# Patient Record
Sex: MALE | Race: WHITE | HISPANIC OR LATINO | Employment: OTHER | URBAN - METROPOLITAN AREA
[De-identification: names, ages, dates, MRNs, and addresses within clinical notes are randomized per-mention and may not be internally consistent; named-entity substitution may affect disease eponyms.]

---

## 2021-07-28 ENCOUNTER — HOSPITAL ENCOUNTER (EMERGENCY)
Age: 51
Discharge: HOME | End: 2021-07-28
Attending: EMERGENCY MEDICINE

## 2021-07-28 VITALS
HEART RATE: 85 BPM | RESPIRATION RATE: 18 BRPM | OXYGEN SATURATION: 96 % | HEIGHT: 67 IN | TEMPERATURE: 100.2 F | SYSTOLIC BLOOD PRESSURE: 143 MMHG | BODY MASS INDEX: 32.84 KG/M2 | DIASTOLIC BLOOD PRESSURE: 74 MMHG | WEIGHT: 209.22 LBS

## 2021-07-28 DIAGNOSIS — R10.9 CHRONIC ABDOMINAL PAIN: Primary | ICD-10-CM

## 2021-07-28 DIAGNOSIS — G89.29 CHRONIC ABDOMINAL PAIN: Primary | ICD-10-CM

## 2021-07-28 LAB
ALBUMIN SERPL-MCNC: 3.3 G/DL (ref 3.6–5.1)
ALBUMIN/GLOB SERPL: 0.6 {RATIO} (ref 1–2.4)
ALP SERPL-CCNC: 82 UNITS/L (ref 45–117)
ALT SERPL-CCNC: 15 UNITS/L
ANION GAP SERPL CALC-SCNC: 16 MMOL/L (ref 10–20)
AST SERPL-CCNC: 11 UNITS/L
BASOPHILS # BLD: 0 K/MCL (ref 0–0.3)
BASOPHILS NFR BLD: 0 %
BILIRUB SERPL-MCNC: 0.5 MG/DL (ref 0.2–1)
BUN SERPL-MCNC: 13 MG/DL (ref 6–20)
BUN/CREAT SERPL: 12 (ref 7–25)
CALCIUM SERPL-MCNC: 9 MG/DL (ref 8.4–10.2)
CHLORIDE SERPL-SCNC: 99 MMOL/L (ref 98–107)
CO2 SERPL-SCNC: 24 MMOL/L (ref 21–32)
CREAT SERPL-MCNC: 1.07 MG/DL (ref 0.67–1.17)
DEPRECATED RDW RBC: 38.6 FL (ref 39–50)
EOSINOPHIL # BLD: 0 K/MCL (ref 0–0.5)
EOSINOPHIL NFR BLD: 0 %
ERYTHROCYTE [DISTWIDTH] IN BLOOD: 13.7 % (ref 11–15)
FASTING DURATION TIME PATIENT: ABNORMAL H
GFR SERPLBLD BASED ON 1.73 SQ M-ARVRAT: 79 ML/MIN/1.73M2
GLOBULIN SER-MCNC: 5.1 G/DL (ref 2–4)
GLUCOSE SERPL-MCNC: 103 MG/DL (ref 65–99)
HCT VFR BLD CALC: 35.3 % (ref 39–51)
HGB BLD-MCNC: 11.3 G/DL (ref 13–17)
IMM GRANULOCYTES # BLD AUTO: 0.1 K/MCL (ref 0–0.2)
IMM GRANULOCYTES # BLD: 0 %
LIPASE SERPL-CCNC: 113 UNITS/L (ref 73–393)
LYMPHOCYTES # BLD: 2.8 K/MCL (ref 1–4)
LYMPHOCYTES NFR BLD: 20 %
MCH RBC QN AUTO: 24.8 PG (ref 26–34)
MCHC RBC AUTO-ENTMCNC: 32 G/DL (ref 32–36.5)
MCV RBC AUTO: 77.6 FL (ref 78–100)
MONOCYTES # BLD: 0.9 K/MCL (ref 0.3–0.9)
MONOCYTES NFR BLD: 6 %
NEUTROPHILS # BLD: 10.3 K/MCL (ref 1.8–7.7)
NEUTROPHILS NFR BLD: 74 %
NRBC BLD MANUAL-RTO: 0 /100 WBC
PLATELET # BLD AUTO: 356 K/MCL (ref 140–450)
POTASSIUM SERPL-SCNC: 3.9 MMOL/L (ref 3.4–5.1)
PROT SERPL-MCNC: 8.4 G/DL (ref 6.4–8.2)
RBC # BLD: 4.55 MIL/MCL (ref 4.5–5.9)
SODIUM SERPL-SCNC: 135 MMOL/L (ref 135–145)
WBC # BLD: 14 K/MCL (ref 4.2–11)

## 2021-07-28 PROCEDURE — 85025 COMPLETE CBC W/AUTO DIFF WBC: CPT | Performed by: EMERGENCY MEDICINE

## 2021-07-28 PROCEDURE — 80053 COMPREHEN METABOLIC PANEL: CPT | Performed by: EMERGENCY MEDICINE

## 2021-07-28 PROCEDURE — 36415 COLL VENOUS BLD VENIPUNCTURE: CPT

## 2021-07-28 PROCEDURE — 10002803 HB RX 637: Performed by: EMERGENCY MEDICINE

## 2021-07-28 PROCEDURE — 83690 ASSAY OF LIPASE: CPT | Performed by: EMERGENCY MEDICINE

## 2021-07-28 PROCEDURE — 99284 EMERGENCY DEPT VISIT MOD MDM: CPT | Performed by: EMERGENCY MEDICINE

## 2021-07-28 PROCEDURE — 99284 EMERGENCY DEPT VISIT MOD MDM: CPT

## 2021-07-28 RX ORDER — DICYCLOMINE HYDROCHLORIDE 10 MG/1
20 CAPSULE ORAL ONCE
Status: COMPLETED | OUTPATIENT
Start: 2021-07-28 | End: 2021-07-28

## 2021-07-28 RX ORDER — FAMOTIDINE 20 MG/1
20 TABLET, FILM COATED ORAL ONCE
Status: COMPLETED | OUTPATIENT
Start: 2021-07-28 | End: 2021-07-28

## 2021-07-28 RX ORDER — SULFAMETHOXAZOLE AND TRIMETHOPRIM 800; 160 MG/1; MG/1
1 TABLET ORAL 2 TIMES DAILY
Qty: 14 TABLET | Refills: 0 | Status: SHIPPED | OUTPATIENT
Start: 2021-07-28 | End: 2021-08-04

## 2021-07-28 RX ADMIN — FAMOTIDINE 20 MG: 20 TABLET ORAL at 22:20

## 2021-07-28 RX ADMIN — DICYCLOMINE HYDROCHLORIDE 20 MG: 10 CAPSULE ORAL at 22:20

## 2021-07-28 ASSESSMENT — ENCOUNTER SYMPTOMS
WEAKNESS: 0
ABDOMINAL PAIN: 1
TROUBLE SWALLOWING: 0
SHORTNESS OF BREATH: 0
FEVER: 0
VOMITING: 0

## 2021-07-28 ASSESSMENT — PAIN SCALES - GENERAL: PAINLEVEL_OUTOF10: 8

## 2021-07-28 ASSESSMENT — PAIN DESCRIPTION - PAIN TYPE: TYPE: ACUTE PAIN

## 2021-10-06 PROBLEM — N13.8 BPH WITH OBSTRUCTION/LOWER URINARY TRACT SYMPTOMS: Status: ACTIVE | Noted: 2021-10-05

## 2021-10-06 PROBLEM — R68.89 RIGORS: Status: ACTIVE | Noted: 2021-10-05

## 2021-10-06 PROBLEM — K92.1 HEMATOCHEZIA: Status: ACTIVE | Noted: 2021-10-05

## 2021-10-06 PROBLEM — C18.7 ADENOCARCINOMA OF SIGMOID COLON (HCC): Status: ACTIVE | Noted: 2021-10-06

## 2021-10-06 PROBLEM — D63.0 ANEMIA ASSOCIATED WITH MALIGNANT NEOPLASTIC DISEASE (HCC): Status: ACTIVE | Noted: 2021-10-05

## 2021-10-06 PROBLEM — R45.89 ANXIETY ABOUT HEALTH: Status: ACTIVE | Noted: 2021-10-06

## 2021-10-06 PROBLEM — R10.32 LEFT LOWER QUADRANT ABDOMINAL PAIN: Status: ACTIVE | Noted: 2021-10-06

## 2021-10-06 PROBLEM — C18.6 MALIGNANT NEOPLASM OF DESCENDING COLON (HCC): Status: ACTIVE | Noted: 2021-10-05

## 2021-10-06 PROBLEM — K62.5 RECTAL BLEEDING: Status: ACTIVE | Noted: 2021-10-06

## 2021-10-06 PROBLEM — R10.9 ABDOMINAL PAIN: Status: ACTIVE | Noted: 2021-05-25

## 2021-10-06 PROBLEM — R10.32 LEFT LOWER QUADRANT ABDOMINAL PAIN: Status: RESOLVED | Noted: 2021-10-06 | Resolved: 2021-10-06

## 2021-10-06 PROBLEM — N40.1 BPH WITH OBSTRUCTION/LOWER URINARY TRACT SYMPTOMS: Status: ACTIVE | Noted: 2021-10-05

## 2021-10-06 PROBLEM — C80.1 ANEMIA ASSOCIATED WITH MALIGNANT NEOPLASTIC DISEASE (HCC): Status: ACTIVE | Noted: 2021-10-05

## 2021-10-06 PROBLEM — R68.89 RIGORS: Status: RESOLVED | Noted: 2021-10-05 | Resolved: 2021-10-06

## 2021-10-06 PROBLEM — K92.1 HEMATOCHEZIA: Status: RESOLVED | Noted: 2021-10-05 | Resolved: 2021-10-06

## 2021-10-06 PROBLEM — K62.5 RECTAL BLEEDING: Status: RESOLVED | Noted: 2021-10-06 | Resolved: 2021-10-06

## 2024-11-27 ENCOUNTER — APPOINTMENT (OUTPATIENT)
Dept: GENERAL RADIOLOGY | Facility: HOSPITAL | Age: 54
End: 2024-11-27
Attending: UROLOGY
Payer: COMMERCIAL

## 2024-11-27 ENCOUNTER — HOSPITAL ENCOUNTER (OUTPATIENT)
Facility: HOSPITAL | Age: 54
Setting detail: HOSPITAL OUTPATIENT SURGERY
Discharge: HOME | End: 2024-11-27
Attending: UROLOGY | Admitting: UROLOGY
Payer: COMMERCIAL

## 2024-11-27 ENCOUNTER — ANESTHESIA (OUTPATIENT)
Dept: SURGERY | Facility: HOSPITAL | Age: 54
End: 2024-11-27
Payer: COMMERCIAL

## 2024-11-27 ENCOUNTER — ANESTHESIA EVENT (OUTPATIENT)
Dept: SURGERY | Facility: HOSPITAL | Age: 54
End: 2024-11-27
Payer: COMMERCIAL

## 2024-11-27 VITALS
HEIGHT: 67.5 IN | HEART RATE: 56 BPM | RESPIRATION RATE: 14 BRPM | WEIGHT: 188.38 LBS | BODY MASS INDEX: 29.22 KG/M2 | OXYGEN SATURATION: 100 % | TEMPERATURE: 97 F | SYSTOLIC BLOOD PRESSURE: 110 MMHG | DIASTOLIC BLOOD PRESSURE: 72 MMHG

## 2024-11-27 DIAGNOSIS — N13.5 URETERAL STRICTURE, RIGHT: Primary | ICD-10-CM

## 2024-11-27 PROBLEM — R10.9 ABDOMINAL PAIN: Status: RESOLVED | Noted: 2021-05-25 | Resolved: 2024-11-27

## 2024-11-27 PROCEDURE — 0T768DZ DILATION OF RIGHT URETER WITH INTRALUMINAL DEVICE, VIA NATURAL OR ARTIFICIAL OPENING ENDOSCOPIC: ICD-10-PCS | Performed by: UROLOGY

## 2024-11-27 DEVICE — URETERAL STENT
Type: IMPLANTABLE DEVICE | Site: URETER | Status: FUNCTIONAL
Brand: ASCERTA™

## 2024-11-27 RX ORDER — HYDROCODONE BITARTRATE AND ACETAMINOPHEN 5; 325 MG/1; MG/1
2 TABLET ORAL ONCE AS NEEDED
Status: DISCONTINUED | OUTPATIENT
Start: 2024-11-27 | End: 2024-11-27

## 2024-11-27 RX ORDER — HYDROCODONE BITARTRATE AND ACETAMINOPHEN 5; 325 MG/1; MG/1
1 TABLET ORAL ONCE AS NEEDED
Status: DISCONTINUED | OUTPATIENT
Start: 2024-11-27 | End: 2024-11-27

## 2024-11-27 RX ORDER — NALOXONE HYDROCHLORIDE 0.4 MG/ML
80 INJECTION, SOLUTION INTRAMUSCULAR; INTRAVENOUS; SUBCUTANEOUS AS NEEDED
Status: DISCONTINUED | OUTPATIENT
Start: 2024-11-27 | End: 2024-11-27

## 2024-11-27 RX ORDER — METOCLOPRAMIDE HYDROCHLORIDE 5 MG/ML
INJECTION INTRAMUSCULAR; INTRAVENOUS AS NEEDED
Status: DISCONTINUED | OUTPATIENT
Start: 2024-11-27 | End: 2024-11-27 | Stop reason: SURG

## 2024-11-27 RX ORDER — AMOXICILLIN 500 MG/1
500 CAPSULE ORAL 3 TIMES DAILY
Qty: 21 CAPSULE | Refills: 0 | Status: SHIPPED | OUTPATIENT
Start: 2024-11-27 | End: 2024-12-04

## 2024-11-27 RX ORDER — SODIUM CHLORIDE, SODIUM LACTATE, POTASSIUM CHLORIDE, CALCIUM CHLORIDE 600; 310; 30; 20 MG/100ML; MG/100ML; MG/100ML; MG/100ML
INJECTION, SOLUTION INTRAVENOUS CONTINUOUS
Status: DISCONTINUED | OUTPATIENT
Start: 2024-11-27 | End: 2024-11-27

## 2024-11-27 RX ORDER — LIDOCAINE HYDROCHLORIDE 20 MG/ML
JELLY TOPICAL AS NEEDED
Status: DISCONTINUED | OUTPATIENT
Start: 2024-11-27 | End: 2024-11-27 | Stop reason: HOSPADM

## 2024-11-27 RX ORDER — SCOLOPAMINE TRANSDERMAL SYSTEM 1 MG/1
1 PATCH, EXTENDED RELEASE TRANSDERMAL ONCE
Status: DISCONTINUED | OUTPATIENT
Start: 2024-11-27 | End: 2024-11-27 | Stop reason: HOSPADM

## 2024-11-27 RX ORDER — HYDROMORPHONE HYDROCHLORIDE 1 MG/ML
0.6 INJECTION, SOLUTION INTRAMUSCULAR; INTRAVENOUS; SUBCUTANEOUS EVERY 5 MIN PRN
Status: DISCONTINUED | OUTPATIENT
Start: 2024-11-27 | End: 2024-11-27

## 2024-11-27 RX ORDER — MEPERIDINE HYDROCHLORIDE 25 MG/ML
12.5 INJECTION INTRAMUSCULAR; INTRAVENOUS; SUBCUTANEOUS AS NEEDED
Status: DISCONTINUED | OUTPATIENT
Start: 2024-11-27 | End: 2024-11-27

## 2024-11-27 RX ORDER — ACETAMINOPHEN 500 MG
1000 TABLET ORAL ONCE AS NEEDED
Status: DISCONTINUED | OUTPATIENT
Start: 2024-11-27 | End: 2024-11-27

## 2024-11-27 RX ORDER — LIDOCAINE HYDROCHLORIDE 10 MG/ML
INJECTION, SOLUTION EPIDURAL; INFILTRATION; INTRACAUDAL; PERINEURAL AS NEEDED
Status: DISCONTINUED | OUTPATIENT
Start: 2024-11-27 | End: 2024-11-27 | Stop reason: SURG

## 2024-11-27 RX ORDER — ONDANSETRON 2 MG/ML
INJECTION INTRAMUSCULAR; INTRAVENOUS AS NEEDED
Status: DISCONTINUED | OUTPATIENT
Start: 2024-11-27 | End: 2024-11-27 | Stop reason: SURG

## 2024-11-27 RX ORDER — ACETAMINOPHEN 500 MG
1000 TABLET ORAL ONCE
Status: DISCONTINUED | OUTPATIENT
Start: 2024-11-27 | End: 2024-11-27

## 2024-11-27 RX ORDER — HYDROMORPHONE HYDROCHLORIDE 1 MG/ML
0.2 INJECTION, SOLUTION INTRAMUSCULAR; INTRAVENOUS; SUBCUTANEOUS EVERY 5 MIN PRN
Status: DISCONTINUED | OUTPATIENT
Start: 2024-11-27 | End: 2024-11-27

## 2024-11-27 RX ORDER — HYDROMORPHONE HYDROCHLORIDE 1 MG/ML
0.4 INJECTION, SOLUTION INTRAMUSCULAR; INTRAVENOUS; SUBCUTANEOUS EVERY 5 MIN PRN
Status: DISCONTINUED | OUTPATIENT
Start: 2024-11-27 | End: 2024-11-27

## 2024-11-27 RX ORDER — LABETALOL HYDROCHLORIDE 5 MG/ML
5 INJECTION, SOLUTION INTRAVENOUS EVERY 5 MIN PRN
Status: DISCONTINUED | OUTPATIENT
Start: 2024-11-27 | End: 2024-11-27

## 2024-11-27 RX ORDER — ONDANSETRON 2 MG/ML
4 INJECTION INTRAMUSCULAR; INTRAVENOUS EVERY 6 HOURS PRN
Status: DISCONTINUED | OUTPATIENT
Start: 2024-11-27 | End: 2024-11-27

## 2024-11-27 RX ORDER — PROCHLORPERAZINE EDISYLATE 5 MG/ML
5 INJECTION INTRAMUSCULAR; INTRAVENOUS EVERY 8 HOURS PRN
Status: DISCONTINUED | OUTPATIENT
Start: 2024-11-27 | End: 2024-11-27

## 2024-11-27 RX ORDER — DIPHENHYDRAMINE HYDROCHLORIDE 50 MG/ML
12.5 INJECTION INTRAMUSCULAR; INTRAVENOUS AS NEEDED
Status: DISCONTINUED | OUTPATIENT
Start: 2024-11-27 | End: 2024-11-27

## 2024-11-27 RX ADMIN — LIDOCAINE HYDROCHLORIDE 100 MG: 10 INJECTION, SOLUTION EPIDURAL; INFILTRATION; INTRACAUDAL; PERINEURAL at 15:47:00

## 2024-11-27 RX ADMIN — ONDANSETRON 4 MG: 2 INJECTION INTRAMUSCULAR; INTRAVENOUS at 15:41:00

## 2024-11-27 RX ADMIN — METOCLOPRAMIDE HYDROCHLORIDE 10 MG: 5 INJECTION INTRAMUSCULAR; INTRAVENOUS at 15:41:00

## 2024-11-27 RX ADMIN — SODIUM CHLORIDE, SODIUM LACTATE, POTASSIUM CHLORIDE, CALCIUM CHLORIDE: 600; 310; 30; 20 INJECTION, SOLUTION INTRAVENOUS at 15:39:00

## 2024-11-27 NOTE — ANESTHESIA PREPROCEDURE EVALUATION
PRE-OP EVALUATION    Patient Name: Aden Berg    Admit Diagnosis: HYDRONEPHROSIS OF RIGHT KIDNEY, RIGHT URETERAL STRICTURE    Pre-op Diagnosis: HYDRONEPHROSIS OF RIGHT KIDNEY, RIGHT URETERAL STRICTURE    CYSTOSCOPY, RIGHT URETERAL STENT EXCHANGE    Anesthesia Procedure: CYSTOSCOPY, RIGHT URETERAL STENT EXCHANGE (Right: Ureter)    Surgeons and Role:     * Kevin Mcgregor MD - Primary    Pre-op vitals reviewed.  Temp: 97.8 °F (36.6 °C)  Pulse: 53  Resp: 16  BP: 113/68  SpO2: 100 %  Body mass index is 29.07 kg/m².    Current medications reviewed.  Hospital Medications:   [Transfer Hold] scopolamine (Transderm-Scop) 1 MG/3DAYS patch 1 patch  1 patch Transdermal Once    lactated ringers infusion   Intravenous Continuous    [COMPLETED] ampicillin (Omnipen) 1 g in sodium chloride 0.9% 100 mL IVPB-MBP  1 g Intravenous Once    lidocaine (Urojet) 2 % urethral jelly    PRN       Outpatient Medications:   Prescriptions Prior to Admission[1]    Allergies: Lactose      Anesthesia Evaluation    Patient summary reviewed.    Anesthetic Complications  (-) history of anesthetic complications         GI/Hepatic/Renal      (+) GERD                           Cardiovascular    Negative cardiovascular ROS.  ECG reviewed.  Exercise tolerance: good     MET: >4                             (-) angina     (-) KAUR  (-) orthopnea  (-) PND     Endo/Other    Negative endo/other ROS.                              Pulmonary    Negative pulmonary ROS.           (+) shortness of breath  (+) recent URI          Neuro/Psych    Negative neuro/psych ROS.                                  Past Surgical History:   Procedure Laterality Date    Colonoscopy  09/17/2021    x2    Cystoscopy,insert ureteral stent      x4 ureteral stent exchange / cystoscopy    Laparoscopy,diagnostic      [facility]  exploratory lap    Other surgical history      colon resection 9/2021-    Other surgical history      port a cath right side     Social History      Socioeconomic History    Marital status:      Spouse name: Mary Lou Murica    Number of children: 3   Occupational History    Occupation:    Tobacco Use    Smoking status: Former     Types: Cigarettes    Smokeless tobacco: Never    Tobacco comments:     Former social smoker, 4-5 cigarettes per 1 week   Vaping Use    Vaping status: Never Used   Substance and Sexual Activity    Alcohol use: Yes     Alcohol/week: 0.0 standard drinks of alcohol     Comment: social use.    Drug use: Never    Sexual activity: Yes     Partners: Female     Comment: .   Other Topics Concern     Service No    Blood Transfusions No    Caffeine Concern No    Occupational Exposure No    Hobby Hazards No    Sleep Concern No    Stress Concern No    Weight Concern No    Special Diet No    Back Care No    Exercise Yes    Bike Helmet No    Seat Belt Yes    Self-Exams Yes     History   Drug Use Unknown     Available pre-op labs reviewed.               Airway      Mallampati: II  Mouth opening: 3 FB  TM distance: 4 - 6 cm  Neck ROM: full Cardiovascular    Cardiovascular exam normal.  Rhythm: regular  Rate: normal  (-) murmur   Dental    Dentition appears grossly intact         Pulmonary      Breath sounds clear to auscultation bilaterally.        (-) wheezes       Other findings              ASA: 2   Plan: general  NPO status verified and patient meets guidelines.          Plan/risks discussed with: patient                We discussed GA w/LMA or ETT and possible scratchy throat and rarely dental damage.  We discussed analgesic plan and PONV prophylaxis.  The patient's questions were answered and consent was attained.          [1]   Medications Prior to Admission   Medication Sig Dispense Refill Last Dose/Taking    Polyethylene Glycol 3350 17 GM/SCOOP Oral Powder Take 17 g by mouth as needed.   Taking As Needed    alfuzosin 10 MG Oral Tablet 24 Hr Take 1 tablet (10 mg total) by mouth every evening.   11/24/2024     docusate sodium 100 MG Oral Cap Take 1 capsule (100 mg total) by mouth as needed.   Taking As Needed    ferrous sulfate 325 (65 FE) MG Oral Tab EC Take 1 tablet (325 mg total) by mouth daily with breakfast.   Taking    B COMPLEX-C-FOLIC ACID OR Take 1 tablet by mouth daily.   11/19/2024    Cholecalciferol 50 MCG (2000 UT) Oral Tab    11/19/2024    hydrocortisone (PROCTOZONE-HC) 2.5 % External Cream Apply small amoutnt perirectal 2 times daily as needed for mild to moderate pain 28 g 2 More than a month    hydrocortisone 25 MG Rectal Suppos Place 1 suppository (25 mg total) rectally daily as needed (for severe rectal pain). 12 suppository 0 More than a month    prochlorperazine (COMPAZINE) 10 mg tablet Take 1 tablet (10 mg total) by mouth every 6 (six) hours as needed for Nausea. 30 tablet 1 More than a month    dexamethasone (DECADRON) 4 MG tablet 4 mg tablets. Take 2 tablets in AM for 2 days after the infusion. Take with food to minimize upset stomach. Do not take later than 3 pm to void sleeping difficulties. 30 tablet 3     ondansetron (ZOFRAN) 8 MG tablet Take 8 mg every 8 hours if needed for nausea. 30 tablet 3 More than a month    tamsulosin (FLOMAX) cap Take 1 capsule (0.4 mg total) by mouth nightly.

## 2024-11-27 NOTE — ANESTHESIA PROCEDURE NOTES
Airway  Date/Time: 11/27/2024 3:47 PM  Urgency: elective      General Information and Staff    Patient location during procedure: OR  Anesthesiologist: Gerhard Lay MD  Performed: anesthesiologist   Performed by: Gerhard Lay MD  Authorized by: Gerhard Lay MD      Indications and Patient Condition  Indications for airway management: anesthesia  Spontaneous Ventilation: absent  Sedation level: deep  Preoxygenated: yes  Patient position: sniffing  Mask difficulty assessment: 1 - vent by mask    Final Airway Details  Final airway type: supraglottic airway      Successful airway: classic  Size 3       Number of attempts at approach: 1  Number of other approaches attempted: 0

## 2024-11-27 NOTE — OPERATIVE REPORT
OPERATIVE NOTE    PATIENT NAME: Aden Berg  YOB: 1970  DATE OF SERVICE: 11/27/2024    SURGEON:  Kevin Mcgregor MD    ASSISTANT:  None    PREOPERATIVE DIAGNOSIS:  Right ureteral stricture, recurrent UTIs with enterococcus    POSTOPERATIVE DIAGNOSIS:  Same    PROCEDURE PERFORMED:  Cystoscopy  Right Ureteral Stent Exchange  Intraoperative interpretation of fluoroscopic images    ASSISTANTS:  None    ANESTHESIA:  General    ANTIBIOTIC PROPHYLAXIS:  Ampicillin    SPECIMENS:  None    DRAINS:  6F x 24 cm JJ right ureteral stent    ESTIMATED BLOOD LOSS:  Minimal    COMPLICATIONS:  No immediate complications.     INDICATIONS FOR PROCEDURE:  Mr. Berg is a 54 year old gentleman with colon cancer s/p a partial colectomy, chemo, and radiation. He developed a right ureteral stricture which has been managed with chronic stent exchanges.  He recently was noted to have recurrent UTIs with enterococcus.  I suspect his stent is colonized with bacteria and this is causing his recurrent infections. I recommended we proceed with a ureteral stent exchange, in hopes of decreasing his recurrent infections.     We discussed the risks of the procedure including bleeding, infection, or damage to the urologic structures.  Furthermore, they understand that ureteral stents can cause flank pain and/or urinary symptoms.     DESCRIPTION OF PROCEDURE:  After reviewing the indications for the procedure, informed consent was reviewed and signed by the patient.    The patient was brought to the operating room and placed in the supine position on the OR table.  SCD's were applied and all pressure points were carefully padded.  At this point, anesthesia was successfully induced.  The patient was then given the perioperative antibiotics listed above prior to the procedure.  The patient was transferred to the dorsal lithotomy position and prepped and draped in the normal sterile fashion using betadine.  We then performed an operative  time out to confirm the correct patient, procedure, and laterality.  Everyone in the room was in agreement.     I began by inserting a 22F rigid cystoscope into the bladder per urethra.  The urethra was without lesions or strictures.  Upon entering the bladder I performed a thorough cystoscopy which did not reveal any bladder lesions, stones, or other pathology.  I identified the bilateral ureteral orifices in their orthotopic locations.  There was a stent eminating from the right UO.  I grasped with flexible graspers and pulled it beyond the urethral meatus. I threaded the stent with a 0.035 wire and advanced this into the renal pelvis under fluoroscopic guidance.     Over the wire, I then placed a new 6F x 24 cm JJ right ureteral stent over the wire. The stent was deployed, with a good proximal curl on fluoroscopy, and a good distal curl visually within the bladder lumen.    I then emptied the bladder of any stone debris and irrigant. 2% viscous lidocaine was instilled into the urethra for post-operative analgesia.    This completed the procedure.  They tolerated it well without complications.    Please note that I was present for, and completed the entirety of this operation myself.    PLAN:  Follow-up for a ureteral stent exchange in 6-8 months months. I will keep him on amoxicillin 500mg TID for 7 days post-op.       Kevin Mcgregor MD  [facility] U[facility]  Office: [phone]

## 2024-11-27 NOTE — DISCHARGE INSTRUCTIONS
Home Care Instructions  CYSTOSCOPY    Operative Site: Slight Burning on urination may be experienced with the first few urinations and scant blood may appear in the urine and will clear in a few days. Drinking water and clear liquids is encouraged.    Local Anesthesia:Resume you normal diet and activity as tolerated avoiding stress to the operative site. Caffeine, alcohol, citrus and spicy foods may worsen burning or urgency.    General Anesthesia / Local with Sedation:The sedation stays in your body about 24 hours. You may experience headache, soreness, aching, nausea, vomiting, dizziness and/or tiredness. Sore throat and hoarseness can be present after general anesthesia only. Drink liquids and eat light foods. Advance to your regular diet as tolerated. Remain on liquids only if nausea or vomiting is present. NO ALCHOHOLIC BEVERAGES for 24 hours. For the first 24 hours rest and move cautiously, do not drive, operate equipment or make any personal or legal decisions.    Catheter: (if you have one) Your catheter remains in place because a balloon close to the catheter tip (that is in your bladder) was inflated with fluid immediately after the catheter was inserted. If you have been instructed to remove the catheter at home by your doctor, cut the catheter at the short Y. After all the water is drained, gently pull the catheter out. If you have any questions or if you feel pain or resistance when removing the catheter, STOP and call your doctor. After removing the catheter, it is normal to experience some stinging or burning with urination.    Medications: If prescription medication is ordered take as directed. Do not take on an empty stomach. Do not drive or operated equipment. Do not drink alcohol.    Call your doctor for:   Difficulty in urination, inability to urinate, excessive bleeding/discoloration   Severe pain not controlled by pain medication   Persistent nausea and vomiting   Skin rash and general body  itching   Other concerns or questions not addressed in these instructions    Follow any additional instructions given by the surgeon.  IN CASE OF EMERGENCY GO TO THE NEAREST EMERGENCY ROOM

## 2024-11-27 NOTE — H&P
Urology Initial Evaluation:   Encounter Date: 11/26/2024  Referring Physician: No ref. provider found    Chief Complaint:   Recurrent UTI, right ureteral stricture    History of Present Illness:   Aden Berg is a 54 year old male who presents today for evaluation of recurrent UTIs and a right ureteral stricture.    History of colon cancer.  S/P resection and chemo and XRT.  He developed a ureteral stricture, which we have been managing with an indwelling ureteral stent.  Last stent exchange was in August, 2024.     He has had recurrent enterococcus in his urine for the last month. I recommended that we exchange his stent, as it is likely colonized with bacteria.    He presents today for a cystoscopy and right ureteral stent exchange.    He was recently noted to have recurrent colon cancer and will be starting chemotherapy soon.    Past Medical History:    Abdominal pain    Anemia    Anxiety    BPH (benign prostatic hyperplasia)    Colon cancer (HCC)    diagnosed 9/2021   -  surgery 10/2021- chemo-  last tx- 8/2024 radiation -aug 2022    Esophageal reflux    Exposure to medical diagnostic radiation    Hematochezia    Neuropathy    hands feet    Personal history of antineoplastic chemotherapy    PONV (postoperative nausea and vomiting)    Rectal bleeding     Past Surgical History:   Procedure Laterality Date    Colonoscopy  09/17/2021    x2    Cystoscopy,insert ureteral stent      x4 ureteral stent exchange / cystoscopy    Laparoscopy,diagnostic      [facility]  expl[facility]    Other surgical history      colon resection 9/2021-    Other surgical history      port a cath right side     Allergies:  Lactose  No current outpatient medications on file.     Social History:  He reports that he has quit smoking. His smoking use included cigarettes. He has never used smokeless tobacco. He reports current alcohol use. He reports that he does not use drugs.    Family History   Problem Relation Age of Onset     Diabetes Father     Prostate Cancer Father     Cancer Father     Hypertension Mother     Stroke Mother         CVA    No Known Problems Brother     No Known Problems Sister     No Known Problems Brother     No Known Problems Brother     No Known Problems Brother     No Known Problems Brother     Infectious Disease Brother         COVID-19    No Known Problems Sister     No Known Problems Sister     No Known Problems Daughter     No Known Problems Son     No Known Problems Son     Colon Cancer Neg      Review of Systems:   General: Denies anorexia, weight loss, fevers, chills, night sweats, or other constitutional symptoms.   Neurologic: Denies headaches, stiff neck, photophobia, numbness, or weakness of extremities.   Psychiatric: Denies anxiety, depression.  Eyes: Denies vision changes.  Skin: Denies skin changes or rash.  Cardiac: Denies chest pain, palpitations, or orthopnea.  Pulmonary: Denies cough, hemoptysis, shortness of breath, or dyspnea on exertion.  GI: Denies abdominal pain, nausea, vomiting, or changes in bowel movements.  Musculoskeletal: Denies extremity pain, weakness.  : See HPI.    Physical Examination:   Blood pressure 113/68, pulse 53, temperature 97.8 °F (36.6 °C), temperature source Temporal, resp. rate 16, height 5' 7.5\" (1.715 m), weight 188 lb 6.4 oz (85.5 kg), SpO2 100%.  Body mass index is 29.07 kg/m².  General: Awake, Alert, Oriented.  Well-nourished. Appears stated age.  Neurologic: No focal deficits. Normal gait.  HEENT: EOMI. No scleral icterus.  Cardiac: Regular rate and rhythm.  Respiratory: Non-labored respirations.  Abdomen: Soft, Non-tender, non-distended.  No palpable masses. No costovertebral angle tenderness.  Extremities: Warm, well-perfused.  No palpable edema.  Skin: Normal-appearing. No rash or lesions.  Genitourinary: Deferred    Laboratory Review:   Labs reviewed    Radiology Review:   Imaging reviewed     Assessment:   In summary, Aden Berg is a 54 year old male  with a right ureteral stricture and recurrent UTIs.  He presents today for a right ureteral stent exchange.     Plan:   To OR for cystoscopy, right retrograde pyelogram, right ureteral stent exchange.     I have discussed the risks, benefits and alternatives to the proposed procedure/treatment plan with the patient.  Our discussion also included the risks and benefits of the alternatives treatment options, including doing nothing. They were encouraged to ask questions and all questions were answered to their satisfaction.  At the end of our discussion they gave their verbal consent to the proposed procedure/treatment plan.  Plan to keep him on amoxicillin post-operatively for 1 week. I will give IV ampicillin pre-operatively today.       Kevin Mcgregor MD  [facility]  Department of Urology  Office: [phone]

## 2024-11-28 NOTE — ANESTHESIA POSTPROCEDURE EVALUATION
[facility]    Aden Stephenira Patient Status:  Hospital Outpatient Surgery   Age/Gender 54 year old male MRN [id number]   Location [facility] PERIOPERATIVE SERVICE Attending No att. providers found   Hosp Day # 0 PCP Alicia Merrill PA-C       Anesthesia Post-op Note    CYSTOSCOPY, RIGHT URETERAL STENT EXCHANGE    Procedure Summary       Date: 11/27/24 Room / Location:  MAIN OR 07 / [facility] MAIN OR    Anesthesia Start: 1539 Anesthesia Stop: 1616    Procedure: CYSTOSCOPY, RIGHT URETERAL STENT EXCHANGE (Right: Ureter) Diagnosis: (HYDRONEPHROSIS OF RIGHT KIDNEY, RIGHT URETERAL STRICTURE)    Surgeons: Kevin Mcgregor MD Anesthesiologist: Gerhard Lay MD    Anesthesia Type: general ASA Status: 2            Anesthesia Type: general    Vitals Value Taken Time   /72 11/27/24 1745   Temp 97.3 °F (36.3 °C) 11/27/24 1715   Pulse 56 11/27/24 1758   Resp 14 11/27/24 1745   SpO2 100 % 11/27/24 1758   Vitals shown include unfiled device data.    Patient Location: PACU    Anesthesia Type: general    Airway Patency: patent    Postop Pain Control: adequate    Mental Status: preanesthetic baseline    Nausea/Vomiting: none    Cardiopulmonary/Hydration status: stable euvolemic    Complications: no apparent anesthesia related complications    Postop vital signs: stable    Dental Exam: Unchanged from Preop    Patient to be discharged from PACU when criteria met.

## (undated) DEVICE — ADHESIVE LIQ 2/3ML VI MASTISOL

## (undated) DEVICE — GUIDEWIRE .035X150 STR ZIPWIRE

## (undated) DEVICE — SINGLE-USE DIGITAL FLEXIBLE URETEROSCOPE: Brand: LITHOVUE

## (undated) DEVICE — CATHETER URET 5FR L70CM FLX OPN TIP NONPORTED

## (undated) DEVICE — GLOVE SUR 7.5 SENSICARE NEOPR PWD F

## (undated) DEVICE — PACK PBDS CYSTOSCOPY

## (undated) DEVICE — NITINOL STONE RETRIEVAL BASKET: Brand: ZERO TIP

## (undated) DEVICE — SLEEVE COMPR MD KNEE LEN SGL USE KENDALL SCD

## (undated) DEVICE — SOLUTION IRRIG 3000ML 0.9% NACL FLX CONT

## (undated) DEVICE — 20 ML SYRINGE LUER-LOCK TIP: Brand: MONOJECT